# Patient Record
Sex: FEMALE | Race: WHITE | URBAN - METROPOLITAN AREA
[De-identification: names, ages, dates, MRNs, and addresses within clinical notes are randomized per-mention and may not be internally consistent; named-entity substitution may affect disease eponyms.]

---

## 2017-09-25 ENCOUNTER — OFFICE VISIT (OUTPATIENT)
Dept: PULMONOLOGY | Age: 52
End: 2017-09-25

## 2017-09-25 VITALS
OXYGEN SATURATION: 97 % | RESPIRATION RATE: 16 BRPM | WEIGHT: 210 LBS | HEIGHT: 65 IN | DIASTOLIC BLOOD PRESSURE: 78 MMHG | SYSTOLIC BLOOD PRESSURE: 124 MMHG | TEMPERATURE: 98.5 F | BODY MASS INDEX: 34.99 KG/M2 | HEART RATE: 65 BPM

## 2017-09-25 DIAGNOSIS — R06.83 SNORING: ICD-10-CM

## 2017-09-25 DIAGNOSIS — Z78.9 DIFFICULTY WITH BIPAP USE: ICD-10-CM

## 2017-09-25 DIAGNOSIS — J32.9 RECURRENT SINUS INFECTIONS: ICD-10-CM

## 2017-09-25 DIAGNOSIS — G47.33 OSA (OBSTRUCTIVE SLEEP APNEA): Primary | ICD-10-CM

## 2017-09-25 DIAGNOSIS — E66.01 MORBID OBESITY, UNSPECIFIED OBESITY TYPE (HCC): ICD-10-CM

## 2017-09-25 PROCEDURE — 3017F COLORECTAL CA SCREEN DOC REV: CPT | Performed by: INTERNAL MEDICINE

## 2017-09-25 PROCEDURE — G8427 DOCREV CUR MEDS BY ELIG CLIN: HCPCS | Performed by: INTERNAL MEDICINE

## 2017-09-25 PROCEDURE — 99203 OFFICE O/P NEW LOW 30 MIN: CPT | Performed by: INTERNAL MEDICINE

## 2017-09-25 PROCEDURE — 1036F TOBACCO NON-USER: CPT | Performed by: INTERNAL MEDICINE

## 2017-09-25 PROCEDURE — 3014F SCREEN MAMMO DOC REV: CPT | Performed by: INTERNAL MEDICINE

## 2017-09-25 PROCEDURE — G8417 CALC BMI ABV UP PARAM F/U: HCPCS | Performed by: INTERNAL MEDICINE

## 2017-09-25 RX ORDER — AMITRIPTYLINE HYDROCHLORIDE 10 MG/1
10 TABLET, FILM COATED ORAL
COMMUNITY
Start: 2017-04-25

## 2017-09-25 RX ORDER — FLUTICASONE PROPIONATE 50 MCG
SPRAY, SUSPENSION (ML) NASAL
COMMUNITY

## 2017-09-25 RX ORDER — CETIRIZINE HYDROCHLORIDE 10 MG/1
10 TABLET ORAL
COMMUNITY

## 2017-09-25 RX ORDER — DULOXETIN HYDROCHLORIDE 60 MG/1
60 CAPSULE, DELAYED RELEASE ORAL
COMMUNITY
Start: 2017-04-04

## 2017-09-25 RX ORDER — GABAPENTIN 400 MG/1
CAPSULE ORAL
COMMUNITY
Start: 2017-08-17

## 2017-09-25 RX ORDER — BACLOFEN 10 MG/1
10 TABLET ORAL 4 TIMES DAILY PRN
COMMUNITY

## 2017-09-25 RX ORDER — AZELASTINE 1 MG/ML
SPRAY, METERED NASAL
COMMUNITY

## 2017-09-25 ASSESSMENT — SLEEP AND FATIGUE QUESTIONNAIRES
HOW LIKELY ARE YOU TO NOD OFF OR FALL ASLEEP WHILE SITTING QUIETLY AFTER LUNCH WITHOUT ALCOHOL: 1
HOW LIKELY ARE YOU TO NOD OFF OR FALL ASLEEP WHILE LYING DOWN TO REST IN THE AFTERNOON WHEN CIRCUMSTANCES PERMIT: 1
NECK CIRCUMFERENCE (INCHES): 16.5
HOW LIKELY ARE YOU TO NOD OFF OR FALL ASLEEP IN A CAR, WHILE STOPPED FOR A FEW MINUTES IN TRAFFIC: 0
HOW LIKELY ARE YOU TO NOD OFF OR FALL ASLEEP WHILE WATCHING TV: 0
HOW LIKELY ARE YOU TO NOD OFF OR FALL ASLEEP WHEN YOU ARE A PASSENGER IN A CAR FOR AN HOUR WITHOUT A BREAK: 3
HOW LIKELY ARE YOU TO NOD OFF OR FALL ASLEEP WHILE SITTING INACTIVE IN A PUBLIC PLACE: 1
HOW LIKELY ARE YOU TO NOD OFF OR FALL ASLEEP WHILE SITTING AND TALKING TO SOMEONE: 0
HOW LIKELY ARE YOU TO NOD OFF OR FALL ASLEEP WHILE SITTING AND READING: 3
ESS TOTAL SCORE: 9

## 2017-10-25 ENCOUNTER — OFFICE VISIT (OUTPATIENT)
Dept: PULMONOLOGY | Age: 52
End: 2017-10-25

## 2017-10-25 VITALS
OXYGEN SATURATION: 98 % | SYSTOLIC BLOOD PRESSURE: 138 MMHG | TEMPERATURE: 98.6 F | HEIGHT: 65 IN | BODY MASS INDEX: 34.72 KG/M2 | WEIGHT: 208.4 LBS | HEART RATE: 58 BPM | RESPIRATION RATE: 16 BRPM | DIASTOLIC BLOOD PRESSURE: 78 MMHG

## 2017-10-25 DIAGNOSIS — J30.9 ALLERGIC RHINITIS, UNSPECIFIED CHRONICITY, UNSPECIFIED SEASONALITY, UNSPECIFIED TRIGGER: ICD-10-CM

## 2017-10-25 DIAGNOSIS — R06.83 SNORING: ICD-10-CM

## 2017-10-25 DIAGNOSIS — G47.33 OSA (OBSTRUCTIVE SLEEP APNEA): Primary | ICD-10-CM

## 2017-10-25 PROCEDURE — G8417 CALC BMI ABV UP PARAM F/U: HCPCS | Performed by: INTERNAL MEDICINE

## 2017-10-25 PROCEDURE — 3017F COLORECTAL CA SCREEN DOC REV: CPT | Performed by: INTERNAL MEDICINE

## 2017-10-25 PROCEDURE — 99214 OFFICE O/P EST MOD 30 MIN: CPT | Performed by: INTERNAL MEDICINE

## 2017-10-25 PROCEDURE — 1036F TOBACCO NON-USER: CPT | Performed by: INTERNAL MEDICINE

## 2017-10-25 PROCEDURE — G8484 FLU IMMUNIZE NO ADMIN: HCPCS | Performed by: INTERNAL MEDICINE

## 2017-10-25 PROCEDURE — 3014F SCREEN MAMMO DOC REV: CPT | Performed by: INTERNAL MEDICINE

## 2017-10-25 PROCEDURE — G8427 DOCREV CUR MEDS BY ELIG CLIN: HCPCS | Performed by: INTERNAL MEDICINE

## 2017-10-25 ASSESSMENT — SLEEP AND FATIGUE QUESTIONNAIRES
ESS TOTAL SCORE: 9
HOW LIKELY ARE YOU TO NOD OFF OR FALL ASLEEP WHILE SITTING AND TALKING TO SOMEONE: 0
HOW LIKELY ARE YOU TO NOD OFF OR FALL ASLEEP WHEN YOU ARE A PASSENGER IN A CAR FOR AN HOUR WITHOUT A BREAK: 2
NECK CIRCUMFERENCE (INCHES): 14.5
HOW LIKELY ARE YOU TO NOD OFF OR FALL ASLEEP WHILE SITTING AND READING: 2
HOW LIKELY ARE YOU TO NOD OFF OR FALL ASLEEP IN A CAR, WHILE STOPPED FOR A FEW MINUTES IN TRAFFIC: 0
HOW LIKELY ARE YOU TO NOD OFF OR FALL ASLEEP WHILE SITTING INACTIVE IN A PUBLIC PLACE: 0
HOW LIKELY ARE YOU TO NOD OFF OR FALL ASLEEP WHILE WATCHING TV: 2
HOW LIKELY ARE YOU TO NOD OFF OR FALL ASLEEP WHILE SITTING QUIETLY AFTER LUNCH WITHOUT ALCOHOL: 0
HOW LIKELY ARE YOU TO NOD OFF OR FALL ASLEEP WHILE LYING DOWN TO REST IN THE AFTERNOON WHEN CIRCUMSTANCES PERMIT: 3

## 2017-10-25 NOTE — PROGRESS NOTES
[facility]                                                                  CHIEF COMPLAINT: ALEXANDRU follow up         HPI:   Just started using her BiPAP since 10/6 when she had her so clean. Doing better. Patient is using BiPAP 9  hrs/night. Using humidifier. + snoring on BiPAP. The pressure is well tolerated. The mask is comfortable. No mask leak. No significant daytime sleepiness. No nodding off when driving. Bedtime is 10-12 am and rise time is 7-9 am. Sleep onset is 10 minutes. ESS is 9. SoClean helping a lot she said. Nasal sprays helping her rhinitis       Past Medical History:   Diagnosis Date    Fibromyalgia 05/2011    Sleep apnea        Past Surgical History:        Procedure Laterality Date    CARPAL TUNNEL RELEASE      both    TONSILLECTOMY         Allergies:  is allergic to erythromycin; tetracycline; ciprofloxacin; hydrocodone-acetaminophen; methotrexate derivatives; and other. Social History:    TOBACCO:   reports that she quit smoking about 40 years ago. Her smoking use included Cigarettes. She has never used smokeless tobacco.  ETOH:   reports that she does not drink alcohol.       Family History:       Problem Relation Age of Onset    Thyroid Disease Mother     High Blood Pressure Mother     Cancer Mother      lung cancer     Cancer Father      Lung cancer    Thyroid Disease Sister     No Known Problems Brother     Thyroid Disease Maternal Grandmother     Stroke Maternal Grandmother     Anemia Maternal Grandmother     No Known Problems Maternal Grandfather     Thyroid Disease Paternal Grandmother     Colon Cancer Paternal Grandmother     Heart Attack Paternal Grandmother     Parkinsonism Paternal Grandfather     Alcohol Abuse Paternal Grandfather        Current Medications:    Current Outpatient Prescriptions:     amitriptyline (ELAVIL) 10 MG tablet, Take 10 mg by mouth, Disp: , Rfl:     fluticasone

## 2017-12-06 ENCOUNTER — OFFICE VISIT (OUTPATIENT)
Dept: PULMONOLOGY | Age: 52
End: 2017-12-06

## 2017-12-06 ENCOUNTER — TELEPHONE (OUTPATIENT)
Dept: PULMONOLOGY | Age: 52
End: 2017-12-06

## 2017-12-06 VITALS
TEMPERATURE: 98.5 F | DIASTOLIC BLOOD PRESSURE: 68 MMHG | HEIGHT: 65 IN | BODY MASS INDEX: 34.52 KG/M2 | WEIGHT: 207.2 LBS | HEART RATE: 72 BPM | SYSTOLIC BLOOD PRESSURE: 128 MMHG | RESPIRATION RATE: 16 BRPM | OXYGEN SATURATION: 98 %

## 2017-12-06 DIAGNOSIS — J30.9 ALLERGIC RHINITIS, UNSPECIFIED CHRONICITY, UNSPECIFIED SEASONALITY, UNSPECIFIED TRIGGER: ICD-10-CM

## 2017-12-06 DIAGNOSIS — G47.33 OSA (OBSTRUCTIVE SLEEP APNEA): Primary | ICD-10-CM

## 2017-12-06 PROCEDURE — 3014F SCREEN MAMMO DOC REV: CPT | Performed by: INTERNAL MEDICINE

## 2017-12-06 PROCEDURE — 1036F TOBACCO NON-USER: CPT | Performed by: INTERNAL MEDICINE

## 2017-12-06 PROCEDURE — G8427 DOCREV CUR MEDS BY ELIG CLIN: HCPCS | Performed by: INTERNAL MEDICINE

## 2017-12-06 PROCEDURE — G8417 CALC BMI ABV UP PARAM F/U: HCPCS | Performed by: INTERNAL MEDICINE

## 2017-12-06 PROCEDURE — G8484 FLU IMMUNIZE NO ADMIN: HCPCS | Performed by: INTERNAL MEDICINE

## 2017-12-06 PROCEDURE — 3017F COLORECTAL CA SCREEN DOC REV: CPT | Performed by: INTERNAL MEDICINE

## 2017-12-06 PROCEDURE — 99214 OFFICE O/P EST MOD 30 MIN: CPT | Performed by: INTERNAL MEDICINE

## 2017-12-06 ASSESSMENT — SLEEP AND FATIGUE QUESTIONNAIRES
HOW LIKELY ARE YOU TO NOD OFF OR FALL ASLEEP WHILE SITTING QUIETLY AFTER LUNCH WITHOUT ALCOHOL: 1
HOW LIKELY ARE YOU TO NOD OFF OR FALL ASLEEP IN A CAR, WHILE STOPPED FOR A FEW MINUTES IN TRAFFIC: 0
HOW LIKELY ARE YOU TO NOD OFF OR FALL ASLEEP WHILE LYING DOWN TO REST IN THE AFTERNOON WHEN CIRCUMSTANCES PERMIT: 3
NECK CIRCUMFERENCE (INCHES): 14
HOW LIKELY ARE YOU TO NOD OFF OR FALL ASLEEP WHEN YOU ARE A PASSENGER IN A CAR FOR AN HOUR WITHOUT A BREAK: 1
HOW LIKELY ARE YOU TO NOD OFF OR FALL ASLEEP WHILE WATCHING TV: 2
HOW LIKELY ARE YOU TO NOD OFF OR FALL ASLEEP WHILE SITTING INACTIVE IN A PUBLIC PLACE: 0
HOW LIKELY ARE YOU TO NOD OFF OR FALL ASLEEP WHILE SITTING AND READING: 3
ESS TOTAL SCORE: 10
HOW LIKELY ARE YOU TO NOD OFF OR FALL ASLEEP WHILE SITTING AND TALKING TO SOMEONE: 0

## 2017-12-06 NOTE — PROGRESS NOTES
[facility]                                                                  CHIEF COMPLAINT: ALEXANDRU follow up         HPI:   BiPAP was increased and did not notice a difference. No more snoring on BiPAP. Patient is using BiPAP 8-9  hrs/night. Using humidifier. No snoring on BiPAP. The pressure is well tolerated. The mask is comfortable. No mask leak. No significant daytime sleepiness. No nodding off when driving. Bedtime is 10-12 am and rise time is 7-9 am. Sleep onset is few minutes. ESS is 10    Astelin and Flonase and working well- helping congestions         Past Medical History:   Diagnosis Date    Fibromyalgia 05/2011    Sleep apnea        Past Surgical History:        Procedure Laterality Date    CARPAL TUNNEL RELEASE      both    TONSILLECTOMY         Allergies:  is allergic to erythromycin; tetracycline; ciprofloxacin; hydrocodone-acetaminophen; methotrexate derivatives; and other. Social History:    TOBACCO:   reports that she quit smoking about 40 years ago. Her smoking use included Cigarettes. She has never used smokeless tobacco.  ETOH:   reports that she does not drink alcohol.       Family History:       Problem Relation Age of Onset    Thyroid Disease Mother     High Blood Pressure Mother     Cancer Mother      lung cancer     Cancer Father      Lung cancer    Thyroid Disease Sister     No Known Problems Brother     Thyroid Disease Maternal Grandmother     Stroke Maternal Grandmother     Anemia Maternal Grandmother     No Known Problems Maternal Grandfather     Thyroid Disease Paternal Grandmother     Colon Cancer Paternal Grandmother     Heart Attack Paternal Grandmother     Parkinsonism Paternal Grandfather     Alcohol Abuse Paternal Grandfather        Current Medications:    Current Outpatient Prescriptions:     amitriptyline (ELAVIL) 10 MG tablet, Take 10 mg by mouth, Disp: , Rfl:     fluticasone

## 2017-12-06 NOTE — PATIENT INSTRUCTIONS
Sleep Hygiene. .. Tips for better sleep. .. Avoid naps. This will ensure you are sleepy at bedtime. If you have to take a nap, sleep less than 1 hour, before 3 pm.  Sleep only when sleepy; this reduces the time you are awake in bed. Regular exercise is recommended to help you deepen your sleep, but not within 4-6 hours of your bedtime. Timing of exercise is important, aim to exercise early in the morning or early afternoon. A light snack may help you fall asleep. Warm milk and foods high in the amino acid tryptophan, such as bananas, may help you to sleep  Be sure to avoid heavy, spicy or sugary foods 4-6 hours before bedtime and avoid at snack time. Stay away from stimulants such as caffeine and nicotine for at least 4-6 hours before bed. Stimulants can interfere with your ability to fall asleep. Caffeine is found in tea, cola, coffee, cocoa and chocolate and is best avoided at bedtime. Nicotine is found in tobacco products. Avoid alcohol 4-6 hours before bedtime. Alcohol has an immediate sleep-inducing effect, after a few hours when alcohol levels fall there is a stimulant or wake-up effect and will cause fragmented sleep. Sleep rituals are important. Give your body clues it is time to slow down and sleep. Examples include; yoga, deep breathing, listen to relaxing music, a hot bath or a few minutes of reading. Have a fixed bedtime and awakening time, Even on weekends! You will feel better keeping a regular sleep cycle, even if you are retired or not working. Get into your favorite sleep position. If not asleep in 30 minutes, get up and do something boring until you feel sleepy. Remember not to expose yourself to bright lights such as TV, phone or tablet screens. Only use your bed for sleeping. Do not use your bed as an office, workroom or recreation room. Use comfortable bedding. Uncomfortable bedding can prevent good sleep. Ensure your bedroom is quiet and comfortable.  A cooler room along with enough blankets to stay warm is recommended. If your room is too noisy, try a white noise machine. If too bright, try black out shades or an eye mask. Dont take worries to bed. Leave worries about work, school etc. behind you when you go to bed. Some people find it helpful to assign a worry period in the evening or late afternoon to write down your worries and get them out of your system.

## 2018-06-07 ENCOUNTER — OFFICE VISIT (OUTPATIENT)
Dept: PULMONOLOGY | Age: 53
End: 2018-06-07

## 2018-06-07 VITALS
HEART RATE: 67 BPM | TEMPERATURE: 98.1 F | DIASTOLIC BLOOD PRESSURE: 68 MMHG | WEIGHT: 212 LBS | OXYGEN SATURATION: 97 % | SYSTOLIC BLOOD PRESSURE: 126 MMHG | HEIGHT: 65 IN | BODY MASS INDEX: 35.32 KG/M2 | RESPIRATION RATE: 18 BRPM

## 2018-06-07 DIAGNOSIS — G47.33 OSA (OBSTRUCTIVE SLEEP APNEA): Primary | ICD-10-CM

## 2018-06-07 DIAGNOSIS — J30.9 ALLERGIC RHINITIS, UNSPECIFIED CHRONICITY, UNSPECIFIED SEASONALITY, UNSPECIFIED TRIGGER: ICD-10-CM

## 2018-06-07 DIAGNOSIS — F45.8 AEROPHAGIA: ICD-10-CM

## 2018-06-07 DIAGNOSIS — G47.33 OBSTRUCTIVE SLEEP APNEA SYNDROME: ICD-10-CM

## 2018-06-07 PROCEDURE — G8417 CALC BMI ABV UP PARAM F/U: HCPCS | Performed by: INTERNAL MEDICINE

## 2018-06-07 PROCEDURE — G8427 DOCREV CUR MEDS BY ELIG CLIN: HCPCS | Performed by: INTERNAL MEDICINE

## 2018-06-07 PROCEDURE — 1036F TOBACCO NON-USER: CPT | Performed by: INTERNAL MEDICINE

## 2018-06-07 PROCEDURE — 99214 OFFICE O/P EST MOD 30 MIN: CPT | Performed by: INTERNAL MEDICINE

## 2018-06-07 PROCEDURE — 3017F COLORECTAL CA SCREEN DOC REV: CPT | Performed by: INTERNAL MEDICINE

## 2018-06-07 ASSESSMENT — SLEEP AND FATIGUE QUESTIONNAIRES
HOW LIKELY ARE YOU TO NOD OFF OR FALL ASLEEP WHILE WATCHING TV: 1
HOW LIKELY ARE YOU TO NOD OFF OR FALL ASLEEP WHILE SITTING AND TALKING TO SOMEONE: 0
ESS TOTAL SCORE: 9
HOW LIKELY ARE YOU TO NOD OFF OR FALL ASLEEP WHILE LYING DOWN TO REST IN THE AFTERNOON WHEN CIRCUMSTANCES PERMIT: 2
HOW LIKELY ARE YOU TO NOD OFF OR FALL ASLEEP WHILE SITTING QUIETLY AFTER LUNCH WITHOUT ALCOHOL: 1
HOW LIKELY ARE YOU TO NOD OFF OR FALL ASLEEP IN A CAR, WHILE STOPPED FOR A FEW MINUTES IN TRAFFIC: 0
HOW LIKELY ARE YOU TO NOD OFF OR FALL ASLEEP WHILE SITTING AND READING: 3
HOW LIKELY ARE YOU TO NOD OFF OR FALL ASLEEP WHILE SITTING INACTIVE IN A PUBLIC PLACE: 0
HOW LIKELY ARE YOU TO NOD OFF OR FALL ASLEEP WHEN YOU ARE A PASSENGER IN A CAR FOR AN HOUR WITHOUT A BREAK: 2
NECK CIRCUMFERENCE (INCHES): 16

## 2019-07-25 ENCOUNTER — OFFICE VISIT (OUTPATIENT)
Dept: PULMONOLOGY | Age: 54
End: 2019-07-25
Payer: MEDICARE

## 2019-07-25 VITALS
OXYGEN SATURATION: 98 % | DIASTOLIC BLOOD PRESSURE: 88 MMHG | SYSTOLIC BLOOD PRESSURE: 136 MMHG | HEART RATE: 62 BPM | WEIGHT: 205 LBS | BODY MASS INDEX: 34.16 KG/M2 | HEIGHT: 65 IN

## 2019-07-25 DIAGNOSIS — F45.8 AEROPHAGIA: ICD-10-CM

## 2019-07-25 DIAGNOSIS — J30.9 ALLERGIC RHINITIS, UNSPECIFIED SEASONALITY, UNSPECIFIED TRIGGER: ICD-10-CM

## 2019-07-25 DIAGNOSIS — G47.33 OSA TREATED WITH BIPAP: Primary | ICD-10-CM

## 2019-07-25 PROCEDURE — 99214 OFFICE O/P EST MOD 30 MIN: CPT | Performed by: INTERNAL MEDICINE

## 2019-07-25 PROCEDURE — 1036F TOBACCO NON-USER: CPT | Performed by: INTERNAL MEDICINE

## 2019-07-25 PROCEDURE — 3017F COLORECTAL CA SCREEN DOC REV: CPT | Performed by: INTERNAL MEDICINE

## 2019-07-25 PROCEDURE — G8427 DOCREV CUR MEDS BY ELIG CLIN: HCPCS | Performed by: INTERNAL MEDICINE

## 2019-07-25 PROCEDURE — G8417 CALC BMI ABV UP PARAM F/U: HCPCS | Performed by: INTERNAL MEDICINE

## 2019-07-25 ASSESSMENT — SLEEP AND FATIGUE QUESTIONNAIRES
HOW LIKELY ARE YOU TO NOD OFF OR FALL ASLEEP WHILE WATCHING TV: 1
ESS TOTAL SCORE: 7
HOW LIKELY ARE YOU TO NOD OFF OR FALL ASLEEP WHILE SITTING AND TALKING TO SOMEONE: 0
NECK CIRCUMFERENCE (INCHES): 14.75
HOW LIKELY ARE YOU TO NOD OFF OR FALL ASLEEP WHILE SITTING QUIETLY AFTER LUNCH WITHOUT ALCOHOL: 0
HOW LIKELY ARE YOU TO NOD OFF OR FALL ASLEEP IN A CAR, WHILE STOPPED FOR A FEW MINUTES IN TRAFFIC: 1
HOW LIKELY ARE YOU TO NOD OFF OR FALL ASLEEP WHILE SITTING AND READING: 1
HOW LIKELY ARE YOU TO NOD OFF OR FALL ASLEEP WHILE LYING DOWN TO REST IN THE AFTERNOON WHEN CIRCUMSTANCES PERMIT: 3
HOW LIKELY ARE YOU TO NOD OFF OR FALL ASLEEP WHILE SITTING INACTIVE IN A PUBLIC PLACE: 1
HOW LIKELY ARE YOU TO NOD OFF OR FALL ASLEEP WHEN YOU ARE A PASSENGER IN A CAR FOR AN HOUR WITHOUT A BREAK: 0

## 2019-07-25 NOTE — PROGRESS NOTES
reviewed by me. 6-7 hrs/night with 73% compliance and AHI of  3.6. BIPAP 17/12       Assessment:       · ALEXANDRU. BiPAP 17/12 cmH2O. Full face mask. Optimal compliance and efficacy upon review today. · Allergic rhinitis    · Aerophagia- better         Plan:    · Biflex   · Elevate head of the bed and Encourage supine sleep   · Advised to use BiPAP 6-8 hrs at night and during naps. · Continue Astelin and Flonase PRN   · Replacement of mask, tubing, head straps every 3-6 months or sooner if damaged. · Follow up BIPAP compliance and pressure adjustment if needed  · Sleep hygiene  · Avoid sedatives, alcohol and caffeinated drinks at bed time. · No driving motorized vehicles or operating heavy machinery while fatigue, drowsy or sleepy. · Weight loss is also recommended as a long-term intervention.

## 2020-05-06 ENCOUNTER — TELEPHONE (OUTPATIENT)
Dept: PULMONOLOGY | Age: 55
End: 2020-05-06

## 2020-07-27 ENCOUNTER — TELEPHONE (OUTPATIENT)
Dept: PULMONOLOGY | Age: 55
End: 2020-07-27

## 2020-07-27 NOTE — TELEPHONE ENCOUNTER
limited to the following:    Your Provider(s) may not able to provide medical treatment for your particular condition and you may be required to seek alternative healthcare or emergency care services.  The electronic systems or other security protocols or safeguards used in the Service could fail, causing a breach of privacy of your medical or other information.  Given regulatory requirements in certain jurisdictions, your Provider(s) diagnosis and/or treatment options, especially pertaining to certain prescriptions, may be limited. Acceptance   1. You understand that Services will be provided via Telehealth. This process involves the use of HIPAA compliant and secure, real-time audio-visual interfacing with a qualified and appropriately trained provider located at [facility]. 2. You understand that, under no circumstances, will this session be recorded. 3. You understand that the Provider(s) at [facility] and other clinical participants will be party to the information obtained during the Telehealth session in accordance with best medical practices. 4. You understand that the information obtained during the Telehealth session will be used to help determine the most appropriate treatment options. 5. You understand that You have the right to revoke this consent at any point in time. 6. You understand that Telehealth is voluntary, and that continued treatment is not dependent upon consent. 7. You understand that, in the event of non-consent to Telehealth services and/or technical difficulties, you will obtain services as typically provided in the absence of Telehealth technology. 8. You understand that this consent will be kept in Your medical record. 9. No potential benefits from the use of Telehealth or specific results can be guaranteed. Your condition may not be cured or improved and, in some cases, may get worse.    10. There are limitations in the provision of medical care and treatment via Telehealth and the Service and you may not be able to receive diagnosis and/or treatment through the Service for every condition for which you seek diagnosis and/or treatment. 11. There are potential risks to the use of Telehealth, including but not limited to the risks described in this Telehealth Consent. 12. Your Provider(s) have discussed the use of Telehealth and the Service with you, including the benefits and risks of such and you have provided oral consent to your Provider(s) for the use of Telehealth and the Service. 15. You understand that it is your duty to provide your Provider(s) truthful, accurate and complete information, including all relevant information regarding care that you may have received or may be receiving from other healthcare providers outside of the Service. 14. You understand that each of your Provider(s) may determine in his or sole discretion that your condition is not suitable for diagnosis and/or treatment using the Service, and that you may need to seek medical care and treatment a specialist or other healthcare provider, outside of the Service. 15. You understand that you are fully responsible for payment for all services provided by Provider(s) or through use of the Service and that you may not be able to use third-party insurance. 16. You represent that (a) you have read this Telehealth Consent carefully, (b) you understand the risks and benefits of the Service and the use of Telehealth in the medical care and treatment provided to you by Provider(s) using the Service, and (c) you have the legal capacity and authority to provide this consent for yourself and/or the minor for which you are consenting under applicable federal and state laws, including laws relating to the age of [de-identified] and/or parental/guardian consent.    17. You give your informed consent to the use of Telehealth by Provider(s) using the Service under the terms described in the Terms of Service and this Telehealth Consent. The patient was read the following statement and has consented to the visit as of 7/27/20. The patient has been scheduled for their first telehealth visit on 8/6/2020 with Dr Douglas García.

## 2020-08-06 ENCOUNTER — VIRTUAL VISIT (OUTPATIENT)
Dept: PULMONOLOGY | Age: 55
End: 2020-08-06
Payer: MEDICARE

## 2020-08-06 PROCEDURE — 99214 OFFICE O/P EST MOD 30 MIN: CPT | Performed by: INTERNAL MEDICINE

## 2020-08-06 ASSESSMENT — SLEEP AND FATIGUE QUESTIONNAIRES
HOW LIKELY ARE YOU TO NOD OFF OR FALL ASLEEP WHILE SITTING QUIETLY AFTER LUNCH WITHOUT ALCOHOL: 0
HOW LIKELY ARE YOU TO NOD OFF OR FALL ASLEEP WHILE LYING DOWN TO REST IN THE AFTERNOON WHEN CIRCUMSTANCES PERMIT: 3
HOW LIKELY ARE YOU TO NOD OFF OR FALL ASLEEP WHILE SITTING INACTIVE IN A PUBLIC PLACE: 0
HOW LIKELY ARE YOU TO NOD OFF OR FALL ASLEEP WHILE SITTING AND TALKING TO SOMEONE: 0
HOW LIKELY ARE YOU TO NOD OFF OR FALL ASLEEP WHEN YOU ARE A PASSENGER IN A CAR FOR AN HOUR WITHOUT A BREAK: 2
HOW LIKELY ARE YOU TO NOD OFF OR FALL ASLEEP WHILE SITTING AND READING: 2
HOW LIKELY ARE YOU TO NOD OFF OR FALL ASLEEP WHILE WATCHING TV: 0
HOW LIKELY ARE YOU TO NOD OFF OR FALL ASLEEP IN A CAR, WHILE STOPPED FOR A FEW MINUTES IN TRAFFIC: 0
ESS TOTAL SCORE: 7

## 2020-08-06 NOTE — PROGRESS NOTES
[facility]                                                          TELEHEALTH EVALUATION: Service performed was Audio/Visual (During CKNGS-87 public health emergency) and not a face-to-face visit           [facility] follow up         HPI:   Doing good. Patient is using BiPAP 7-8  hrs/night. Using humidifier. No snoring on BiPAP. Mask and pressure are okay. No significant daytime sleepiness. No nodding off when driving. Bedtime is 11-12 am and rise time is 8-8:30 am. Sleep onset is few minutes. ESS is 7    Astelin and Flonase PRN with help         Past Medical History:   Diagnosis Date    Fibromyalgia 05/2011    Sleep apnea        Past Surgical History:        Procedure Laterality Date    CARPAL TUNNEL RELEASE      both    TONSILLECTOMY         Allergies:  is allergic to doxycycline; erythromycin; red dye; tetracycline; ciprofloxacin; hydrocodone-acetaminophen; methotrexate derivatives; and other. Social History:    TOBACCO:   reports that she has never smoked. She has never used smokeless tobacco.  ETOH:   reports no history of alcohol use.       Family History:       Problem Relation Age of Onset    Thyroid Disease Mother     High Blood Pressure Mother     Cancer Mother         lung cancer     Cancer Father         Lung cancer    Thyroid Disease Sister     No Known Problems Brother     Thyroid Disease Maternal Grandmother     Stroke Maternal Grandmother     Anemia Maternal Grandmother     No Known Problems Maternal Grandfather     Thyroid Disease Paternal Grandmother     Colon Cancer Paternal Grandmother     Heart Attack Paternal Grandmother     Parkinsonism Paternal Grandfather     Alcohol Abuse Paternal Grandfather        Current Medications:    Current Outpatient Medications:     Calcium Carbonate-Vitamin D (CALTRATE 600+D PO), Take 2 tablets by mouth daily, Disp: , Rfl:     amitriptyline (ELAVIL) 10 MG tablet, Take 10 mg by mouth, Disp: , Rfl:     fluticasone (FLONASE) 50 MCG/ACT nasal spray, by Nasal route, Disp: , Rfl:     azelastine (ASTELIN) 0.1 % nasal spray, by Nasal route, Disp: , Rfl:     baclofen (LIORESAL) 10 MG tablet, Take 10 mg by mouth 4 times daily as needed , Disp: , Rfl:     cetirizine (ZYRTEC) 10 MG tablet, Take 10 mg by mouth, Disp: , Rfl:     DULoxetine (CYMBALTA) 60 MG extended release capsule, Take 60 mg by mouth, Disp: , Rfl:     gabapentin (NEURONTIN) 400 MG capsule, twice daily. , Disp: , Rfl:         Objective:   PHYSICAL EXAM:    There were no vitals taken for this visit.' on RA  O2 Sat:  HR:  BP:  RR:  Temperature:  Neck size: Not able to obtain   Mallampati class IV. Constitutional:  No acute distress. Appears well developed and nourished. Eyes: No sclera icterus. EOM intact. No visible discharge. HENT: Head is normocephalic and atraumatic. Mucus membranes are moist and the tongue appears normal. Normal appearing nose. External Ears normal.   Neck: No visualized mass. Evin Shackleton is midline   Resp: No accessory muscle use. Respiratory effort normal. No visualized signs of difficulty breathing or respiratory distress. Cardiovascular: No LE edema per patient's report   Musculoskeletal: No signs of ataxia. Normal range of motion of the neck. Skin: No significant exanthematous lesions or discoloration noted on facial skin    Neuro: Awake. Alert. Able to follow commands. No facial asymmetry. No gaze palsy. Psych: No agitation. Normal affect. No hallucinations. Oriented to person/time/place. No anxiety. Normal judgement and insight. DATA reviewed by me:   BiPAP titration 2/15/2016 BiPAP 13/9 cmh2O   PSG 2/9/2016 RDI 14.2         BiPAP data last 1 week reviewed by me. 8 hrs/night with 100% compliance and AHI of  8.4. BIPAP 13/9  BiPAP data last 4 week reviewed by me. 8 hrs/night with 93%   compliance and AHI of  7.2.  BIPAP 15/10  BiPAP data 05/08-06/06 2018 reviewed by me. 7-8 hrs/night with 96% compliance and AHI of  3.6. BIPAP 17/12   BiPAP data 06/25-07/24 2019 reviewed by me. 6-7 hrs/night with 73% compliance and AHI of  3.6. BIPAP 17/12   BiPAP data 05/06-08/03 2020 reviewed by me. 7-8 hrs/night with 87% compliance and AHI of  3.2. BIPAP 17/12     Assessment:       · ALEXANDRU. BiPAP 17/12 cmH2O. Full face mask. Optimal compliance and efficacy upon review today. · Allergic rhinitis    · Aerophagia- better         Plan:    · Continue BiPAP 6-8 hrs at night and during naps. · Continue Astelin/Flonase PRN   · Replacement of mask, tubing, head straps every 3-6 months or sooner if damaged. · Follow up BIPAP compliance and pressure adjustment if needed  · Sleep hygiene  · Avoid sedatives, alcohol and caffeinated drinks at bed time. · No driving motorized vehicles or operating heavy machinery while fatigue, drowsy or sleepy. · Weight loss is also recommended as a long-term intervention. · Follow up in 1 year        Michoacano Ann is a 63 y.o. female being evaluated by a Virtual Visit (video visit) encounter to address concerns as mentioned above. A caregiver was present when appropriate. Due to this being a TeleHealth encounter (During YCD-99 public health emergency), evaluation of the following organ systems was limited: Vitals/Constitutional/EENT/Resp/CV/GI//MS/Neuro/Skin/Heme-Lymph-Imm. Pursuant to the emergency declaration under the [facility], [address] [place] [address] authority and the [employer] Act, this Virtual Visit was conducted with patient's (and/or legal guardian's) consent, to reduce the patient's risk of exposure to COVID-19 and provide necessary medical care.   The patient (and/or legal guardian) has also been advised to contact this office for worsening conditions or problems, and seek emergency medical treatment and/or call 911 if deemed necessary. Services were provided through a video synchronous discussion virtually to substitute for in-person clinic visit. Patient was located in her home, provider was located in his office. --Mara Bejarano MD on 8/6/2020 at 11:29 AM    An electronic signature was used to authenticate this note.

## 2021-07-06 ENCOUNTER — TELEPHONE (OUTPATIENT)
Dept: PULMONOLOGY | Age: 56
End: 2021-07-06

## 2021-07-06 DIAGNOSIS — G47.33 OSA (OBSTRUCTIVE SLEEP APNEA): Primary | ICD-10-CM

## 2021-07-06 NOTE — TELEPHONE ENCOUNTER
Patient is requesting that an order for a new BiPAP machine be sent to [facility] in [place] because her current machine is broken and cannot be repaired. LOV: 8/6/20  Assessment:       · ALEXANDRU. BiPAP 17/12 cmH2O. Full face mask. Optimal compliance and efficacy upon review today. · Allergic rhinitis    · Aerophagia- better          Plan:    · Continue BiPAP 6-8 hrs at night and during naps. · Continue Astelin/Flonase PRN   · Replacement of mask, tubing, head straps every 3-6 months or sooner if damaged. · Follow up BIPAP compliance and pressure adjustment if needed  · Sleep hygiene  · Avoid sedatives, alcohol and caffeinated drinks at bed time. · No driving motorized vehicles or operating heavy machinery while fatigue, drowsy or sleepy. · Weight loss is also recommended as a long-term intervention.     · Follow up in 1 year

## 2021-07-06 NOTE — TELEPHONE ENCOUNTER
Joy called back to say [facility] told her to make sure the order says they are to set the pressure on her machine, if not she said she would have to bring it in the office to be done and she does not want to have to do this

## 2021-07-12 NOTE — TELEPHONE ENCOUNTER
Pt called stating that [facility] does not have her BIPAP order. Printed and refaxed order. Will f/u to make sure order is rec'd.

## 2021-07-14 NOTE — TELEPHONE ENCOUNTER
Spoke with Shae at [facility] who stated they received the Bipap order and it has been submitted to the re-pap team for processing.

## 2021-08-02 ENCOUNTER — TELEPHONE (OUTPATIENT)
Dept: PULMONOLOGY | Age: 56
End: 2021-08-02

## 2021-08-02 NOTE — TELEPHONE ENCOUNTER
Patient cancelled appointment on 8/9/21 with Dr Arleen Francis for 1 yr sleep . Reason: na    Patient did reschedule appointment. Appointment rescheduled for 11/10/21. Assessment: 8/6/20      · ALEXANDRU. BiPAP 17/12 cmH2O. Full face mask. Optimal compliance and efficacy upon review today. · Allergic rhinitis    · Aerophagia- better          Plan:    · Continue BiPAP 6-8 hrs at night and during naps. · Continue Astelin/Flonase PRN   · Replacement of mask, tubing, head straps every 3-6 months or sooner if damaged. · Follow up BIPAP compliance and pressure adjustment if needed  · Sleep hygiene  · Avoid sedatives, alcohol and caffeinated drinks at bed time. · No driving motorized vehicles or operating heavy machinery while fatigue, drowsy or sleepy. · Weight loss is also recommended as a long-term intervention.     · Follow up in 1 year

## 2021-08-31 ENCOUNTER — TELEPHONE (OUTPATIENT)
Dept: PULMONOLOGY | Age: 56
End: 2021-08-31

## 2021-08-31 NOTE — TELEPHONE ENCOUNTER
Pt called stating that she has been waiting for [facility] to get a new machine since July 4th weekend, and pt went to [facility] today and was told that machines are not available. Pt called [facility] and they have machines in stock, and available to dispense. Pt requested to have orders faxed to [facility]. Orders printed and faxed .

## 2021-11-24 ENCOUNTER — OFFICE VISIT (OUTPATIENT)
Dept: PULMONOLOGY | Age: 56
End: 2021-11-24
Payer: MEDICARE

## 2021-11-24 ENCOUNTER — TELEPHONE (OUTPATIENT)
Dept: PULMONOLOGY | Age: 56
End: 2021-11-24

## 2021-11-24 VITALS
OXYGEN SATURATION: 99 % | SYSTOLIC BLOOD PRESSURE: 146 MMHG | WEIGHT: 205 LBS | RESPIRATION RATE: 17 BRPM | HEIGHT: 65 IN | HEART RATE: 74 BPM | DIASTOLIC BLOOD PRESSURE: 69 MMHG | TEMPERATURE: 98 F | BODY MASS INDEX: 34.16 KG/M2

## 2021-11-24 DIAGNOSIS — E66.9 OBESITY (BMI 30-39.9): ICD-10-CM

## 2021-11-24 DIAGNOSIS — Z71.89 ENCOUNTER FOR BIPAP USE COUNSELING: ICD-10-CM

## 2021-11-24 DIAGNOSIS — G47.33 MILD OBSTRUCTIVE SLEEP APNEA: Primary | ICD-10-CM

## 2021-11-24 PROCEDURE — G8484 FLU IMMUNIZE NO ADMIN: HCPCS | Performed by: NURSE PRACTITIONER

## 2021-11-24 PROCEDURE — 99213 OFFICE O/P EST LOW 20 MIN: CPT | Performed by: NURSE PRACTITIONER

## 2021-11-24 PROCEDURE — G8427 DOCREV CUR MEDS BY ELIG CLIN: HCPCS | Performed by: NURSE PRACTITIONER

## 2021-11-24 PROCEDURE — 3017F COLORECTAL CA SCREEN DOC REV: CPT | Performed by: NURSE PRACTITIONER

## 2021-11-24 PROCEDURE — 1036F TOBACCO NON-USER: CPT | Performed by: NURSE PRACTITIONER

## 2021-11-24 PROCEDURE — G8417 CALC BMI ABV UP PARAM F/U: HCPCS | Performed by: NURSE PRACTITIONER

## 2021-11-24 ASSESSMENT — SLEEP AND FATIGUE QUESTIONNAIRES
HOW LIKELY ARE YOU TO NOD OFF OR FALL ASLEEP WHILE SITTING QUIETLY AFTER LUNCH WITHOUT ALCOHOL: 0
ESS TOTAL SCORE: 6
HOW LIKELY ARE YOU TO NOD OFF OR FALL ASLEEP WHILE LYING DOWN TO REST IN THE AFTERNOON WHEN CIRCUMSTANCES PERMIT: 3
HOW LIKELY ARE YOU TO NOD OFF OR FALL ASLEEP WHILE SITTING AND TALKING TO SOMEONE: 0
HOW LIKELY ARE YOU TO NOD OFF OR FALL ASLEEP IN A CAR, WHILE STOPPED FOR A FEW MINUTES IN TRAFFIC: 0
NECK CIRCUMFERENCE (INCHES): 14.5
HOW LIKELY ARE YOU TO NOD OFF OR FALL ASLEEP WHEN YOU ARE A PASSENGER IN A CAR FOR AN HOUR WITHOUT A BREAK: 0
HOW LIKELY ARE YOU TO NOD OFF OR FALL ASLEEP WHILE WATCHING TV: 1
HOW LIKELY ARE YOU TO NOD OFF OR FALL ASLEEP WHILE SITTING INACTIVE IN A PUBLIC PLACE: 0
HOW LIKELY ARE YOU TO NOD OFF OR FALL ASLEEP WHILE SITTING AND READING: 2

## 2021-11-24 NOTE — PATIENT INSTRUCTIONS
enough blankets to stay warm is recommended. If your room is too noisy, try a white noise machine. If too bright, try black out shades or an eye mask. Dont take worries to bed. Leave worries about work, school etc. behind you when you go to bed. Some people find it helpful to assign a worry period in the evening or late afternoon to write down your worries and get them out of your system. CPAP Equipment Cleaning and Disinfecting Schedule  Equipment Cleaning Frequency Instructions  Disinfecting Frequency   Non-Disposable Filters  Weekly Mild soapy water, Rinse, Air Dry Not Required   Disposable Filters Change as needed  2-4 weeks Do Not Wash Not Required   Hose/tubing Daily Mild soapy water, Rinse, Air Dry Once a week   Mask / Nasal Pillows Daily Mild soapy water, Rinse, Air Dry Once a week   Headgear Weekly Hand wash, Mild soapy water, Rinse, Dry  Not Required   Humidifier Daily Empty water daily  Mild soapy water, Rinse well, Air Dry  Once a week   CPAP Unit As Needed Dust with damp cloth,  No detergents or sprays Not Required         Disinfect (per schedule) with 1 part white vinegar and 3 parts water- soak mask and water chamber for 30 minutes every 1-2 weeks, more often if sick. Allow water/vinegar mixture to run through tubing. Allow all equipment to air dry. Drying Hints:   Always hang tubing away from direct sunlight, as this will cause the tubing to become yellow, brittle and crack over a period of time. DO NOT attach the wet tubing to your CPAP unit to blow-dry it. The moisture from the tubing can drain back into your machine. Moisture in your unit can cause sudden pressure increases or short circuits  DO's and DON'Ts:  - Don't use alcohol-based products to clean your mask, because it can cause the materials to become hard and brittle.    - Don't put headgear in the washer or dryer  - Don't use any caustic or household cleaning solutions such as bleach on your CPAP   equipment.  - Do follow the recommended cleaning schedule. - Do change your disposable filter frequently. Adapted From: [email or web address].   These are general suggestions for all models please follow specific s recommendations and specific instructions

## 2021-11-24 NOTE — PROGRESS NOTES
Patient ID: Octaviano Garnica is a 61 y.o. female who is being seen today for   Chief Complaint   Patient presents with    Sleep Apnea     1 year sleep          HPI:     Octaviano Garnica is a 61 y.o. female in office for ALEXANDRU follow up. States she received new BIPAP. Patient is using BiPAP  7  hrs/night. Using humidifier. No snoring on BiPAP. The pressure is well tolerated. The mask is comfortable- full face. No mask leak. No significant daytieme sleepiness. No nodding off when driving. No dry nose or throat. Minimal fatigue. Bedtime is 11 pm- Mn and rise time is 630-7 am. Sleep onset is few minutes. Wakes up 0 times at night total. No nocturia. Rare naps during the day. No headache in am. No weight gain. 1-2 caffienated beverages during the day. Occasional  alcohol. ESS is 6. Sleep Medicine 11/24/2021 8/6/2020 7/25/2019 6/7/2018 12/6/2017 10/25/2017 9/25/2017   Sitting and reading 2 2 1 3 3 2 3   Watching TV 1 0 1 1 2 2 0   Sitting, inactive in a public place (e.g. a theatre or a meeting) 0 0 1 0 0 0 1   As a passenger in a car for an hour without a break 0 2 0 2 1 2 3   Lying down to rest in the afternoon when circumstances permit 3 3 3 2 3 3 1   Sitting and talking to someone 0 0 0 0 0 0 0   Sitting quietly after a lunch without alcohol 0 0 0 1 1 0 1   In a car, while stopped for a few minutes in traffic 0 0 1 0 0 0 0   Total score 6 7 7 9 10 9 9   Neck circumference 14.5 - 14.75 16 14 14.5 16.5       Past Medical History:  Past Medical History:   Diagnosis Date    Fibromyalgia 05/2011    Sleep apnea        Past Surgical History:        Procedure Laterality Date    CARPAL TUNNEL RELEASE      both    OTHER SURGICAL HISTORY  08/21/2021    L4,L5 fusion     TONSILLECTOMY         Allergies:  is allergic to doxycycline, erythromycin, red dye, tetracycline, ciprofloxacin, hydrocodone-acetaminophen, methotrexate derivatives, and other. Social History:    TOBACCO:   reports that she has never smoked.  She has never used smokeless tobacco.  ETOH:   reports no history of alcohol use. Family History:       Problem Relation Age of Onset    Thyroid Disease Mother     High Blood Pressure Mother     Cancer Mother         lung cancer     Cancer Father         Lung cancer    Thyroid Disease Sister     No Known Problems Brother     Thyroid Disease Maternal Grandmother     Stroke Maternal Grandmother     Anemia Maternal Grandmother     No Known Problems Maternal Grandfather     Thyroid Disease Paternal Grandmother     Colon Cancer Paternal Grandmother     Heart Attack Paternal Grandmother     Parkinsonism Paternal Grandfather     Alcohol Abuse Paternal Grandfather        Current Medications:    Current Outpatient Medications:     amitriptyline (ELAVIL) 10 MG tablet, Take 10 mg by mouth, Disp: , Rfl:     fluticasone (FLONASE) 50 MCG/ACT nasal spray, by Nasal route, Disp: , Rfl:     azelastine (ASTELIN) 0.1 % nasal spray, by Nasal route, Disp: , Rfl:     baclofen (LIORESAL) 10 MG tablet, Take 10 mg by mouth 4 times daily as needed , Disp: , Rfl:     cetirizine (ZYRTEC) 10 MG tablet, Take 10 mg by mouth, Disp: , Rfl:     DULoxetine (CYMBALTA) 60 MG extended release capsule, Take 60 mg by mouth, Disp: , Rfl:     gabapentin (NEURONTIN) 400 MG capsule, twice daily. , Disp: , Rfl:     Calcium Carbonate-Vitamin D (CALTRATE 600+D PO), Take 2 tablets by mouth daily (Patient not taking: Reported on 11/24/2021), Disp: , Rfl:       Objective:   PHYSICAL EXAM:    BP (!) 146/69   Pulse 74   Temp 98 °F (36.7 °C)   Resp 17   Ht 5' 5\" (1.651 m)   Wt 205 lb (93 kg)   SpO2 99% Comment: RA  BMI 34.11 kg/m²     Physical exam:  Gen: No acute distress. Obese. BMI of 34.11  Eyes: PERRL. No sclera icterus. No conjunctival injection. ENT: No discharge. Neck: Trachea midline. No obvious mass. Neck circumference 14.5\"  Resp: No accessory muscle use. No crackles. No wheezes. No rhonchi. CV: Regular rate. Regular rhythm.  No murmur or rub. Skin: Warm and dry. M/S: No cyanosis. No obvious joint deformity. Neuro: Awake. Alert. Moves all four extremities. Psych: Oriented x 3. No anxiety. DATA:   2/9/2016 PSG AHI 8.5, low SPO2 74%  2/10/2016 MSLT mean sleep latency 2 minutes 23 seconds, no sleep onset REM  2/22/2016 titration fairly well controlled ALEXANDRU with BiPAP, not CPAP. Treatment emergent central sleep apnea, recommendations trial BiPAP 13/9 cm H2O    BiPAP download data:  Compliance download report from 10/25/21 to 11/23/21 reviewed today by me and showed patient is using machine 7:04 hrs/night with 93% compliance and AHI 4.8 within this time frame. 28/30days with greater than 4 hours of machine use. BIPAP 17/12 cm H20    Assessment:      · Mild ALEXANDRU. BiPAP 17/12 cm H2O. Optimal compliance and efficacy on review today, residual AHI 4.8  · Snoring-resolved on BiPAP  · Fatigue-improved with BiPAP  · Obesity    Plan:     -Send order to change to BiPAP 18-13 cm H2O  -Follow AHI and adjust pressure if needed  - Advised to use CPAP 6-8 hrs at night and during naps. - Replacement of mask, tubing, head straps every 3-6 months or sooner if damaged. - Patient instructed to contact [employer] company for any mask, tubing or machine trouble shooting if problems arise.  - Sleep hygiene  - Avoid sedatives, alcohol and caffeinated drinks at bed time. - Patient counseled to never drive or operate heavy machinery while fatigue, drowsy or sleepy.    - Weight loss is recommended as a long-term intervention.    - Complications of ALEXANDRU if not treated were discussed with patient patient, including: systemic hypertension, pulmonary hypertension, cardiovascular morbidities, car accidents and all cause mortality.  -Patient education handout provided regarding sleep tips and CPAP cleaning recommendations     Follow up: 1 year, sooner if needed

## 2021-12-27 NOTE — TELEPHONE ENCOUNTER
BiPAP download report from 11/27/2021-12/26/2021 1 BiPAP 18/13 cm H2O reviewed. Compliance is adequate 77%. AHI has improved and is good 3.9.

## 2022-11-21 ENCOUNTER — TELEPHONE (OUTPATIENT)
Dept: PULMONOLOGY | Age: 57
End: 2022-11-21

## 2022-11-21 ENCOUNTER — OFFICE VISIT (OUTPATIENT)
Dept: PULMONOLOGY | Age: 57
End: 2022-11-21
Payer: MEDICARE

## 2022-11-21 VITALS
HEART RATE: 63 BPM | DIASTOLIC BLOOD PRESSURE: 80 MMHG | RESPIRATION RATE: 16 BRPM | BODY MASS INDEX: 35.45 KG/M2 | SYSTOLIC BLOOD PRESSURE: 139 MMHG | WEIGHT: 212.8 LBS | OXYGEN SATURATION: 97 % | HEIGHT: 65 IN

## 2022-11-21 DIAGNOSIS — R53.83 OTHER FATIGUE: ICD-10-CM

## 2022-11-21 DIAGNOSIS — Z71.89 ENCOUNTER FOR BIPAP USE COUNSELING: ICD-10-CM

## 2022-11-21 DIAGNOSIS — R06.83 SNORING: ICD-10-CM

## 2022-11-21 DIAGNOSIS — G47.33 MILD OBSTRUCTIVE SLEEP APNEA: Primary | ICD-10-CM

## 2022-11-21 DIAGNOSIS — E66.9 OBESITY (BMI 30-39.9): ICD-10-CM

## 2022-11-21 PROCEDURE — G8417 CALC BMI ABV UP PARAM F/U: HCPCS | Performed by: NURSE PRACTITIONER

## 2022-11-21 PROCEDURE — G8427 DOCREV CUR MEDS BY ELIG CLIN: HCPCS | Performed by: NURSE PRACTITIONER

## 2022-11-21 PROCEDURE — 99213 OFFICE O/P EST LOW 20 MIN: CPT | Performed by: NURSE PRACTITIONER

## 2022-11-21 PROCEDURE — 1036F TOBACCO NON-USER: CPT | Performed by: NURSE PRACTITIONER

## 2022-11-21 PROCEDURE — 3017F COLORECTAL CA SCREEN DOC REV: CPT | Performed by: NURSE PRACTITIONER

## 2022-11-21 PROCEDURE — G8484 FLU IMMUNIZE NO ADMIN: HCPCS | Performed by: NURSE PRACTITIONER

## 2022-11-21 RX ORDER — DICLOXACILLIN SODIUM 250 MG/1
75 CAPSULE ORAL 2 TIMES DAILY
COMMUNITY

## 2022-11-21 ASSESSMENT — SLEEP AND FATIGUE QUESTIONNAIRES
HOW LIKELY ARE YOU TO NOD OFF OR FALL ASLEEP WHILE SITTING QUIETLY AFTER LUNCH WITHOUT ALCOHOL: 0
HOW LIKELY ARE YOU TO NOD OFF OR FALL ASLEEP WHILE LYING DOWN TO REST IN THE AFTERNOON WHEN CIRCUMSTANCES PERMIT: 1
HOW LIKELY ARE YOU TO NOD OFF OR FALL ASLEEP WHILE WATCHING TV: 0
ESS TOTAL SCORE: 2
HOW LIKELY ARE YOU TO NOD OFF OR FALL ASLEEP WHILE SITTING INACTIVE IN A PUBLIC PLACE: 0
HOW LIKELY ARE YOU TO NOD OFF OR FALL ASLEEP WHILE SITTING AND TALKING TO SOMEONE: 0
HOW LIKELY ARE YOU TO NOD OFF OR FALL ASLEEP WHILE SITTING AND READING: 1
NECK CIRCUMFERENCE (INCHES): 14
HOW LIKELY ARE YOU TO NOD OFF OR FALL ASLEEP WHEN YOU ARE A PASSENGER IN A CAR FOR AN HOUR WITHOUT A BREAK: 0
HOW LIKELY ARE YOU TO NOD OFF OR FALL ASLEEP IN A CAR, WHILE STOPPED FOR A FEW MINUTES IN TRAFFIC: 0

## 2022-11-21 NOTE — PATIENT INSTRUCTIONS
Here are some tips to to getting better sleep  1- Avoid napping during the day: This will ensure you are tired at bedtime. If you have to take a nap, sleep less than one hour, before 3 pm.   2- Exercise regularly, but not right before bed: but the timing of the workout is important. Exercising in the morning or early afternoon will not interfere with sleep. Exercising within two hours before bedtime can decrease your ability to fall asleep. Regular exercise is recommended to help you deepen the sleep. 3- Avoid heavy, spicy, or sugary foods 4-6 hours before bedtime: These can affect your ability to stay asleep. 4- Have a light snack before bed: Having an empty stomach can interfere with your sleep. Dairy products and turkey contain tryptophan, which acts as a natural sleep inducer. 5- Stay away from caffeine, nicotine and alcohol at least 4-6 hours before bed: Caffeine and nicotine are stimulants that interfere with your ability to fall asleep. While alcohol has an immediate sleep-inducing effect, a few hours later, as alcohol levels in your blood start to fall, there is a stimulant effect and you will experience fragmented sleep. 6- Take a hot bath 90 minutes before bedtime:  A hot bath will raise your body temperature, but it is the drop in body temperature that may leave you feeling sleepy  7- Develop sleep rituals: it is important to give your body cues that it is time to slow down and sleep. Listen to relaxing music, read something soothing for 15 minutes, have a cup of caffeine free tea, or do relaxation exercises such as yoga or deep breathing help relieve anxiety and reduce muscle tension. 8- Fix a bedtime and an awakening time: Even on weekends! When your sleep cycle has a regular rhythm, you will feel better. 9- Sleep only when sleepy: This reduces the time you are awake in bed.    10- Get into your favorite sleeping position: If you can't fall asleep within 15-30 minutes, get up and do something boring until you feel sleepy. Sit quietly in the dark or read the warranty on your refrigerator. Don't expose yourself to bright light while you are up, it gives cues to your brain that it is time to wake up. 11- Only use your bed for sleeping: Dont use the bed as an office, workroom or recreation room. Let your body \"know\" that the bed is associated with sleeping  12- Use comfortable bedding. Uncomfortable bedding can prevent good sleep. Evaluate whether or not this is a source of your problem, and make appropriate changes. 13- Make sure your bed and bedroom are quiet and comfortable: A hot room can be uncomfortable. A cooler room, along with enough blankets to stay warm is recommended. Get a blackout shade or wear a slumber mask and wear earplugs or get a \"white noise\" machine for light and noise distractions. 14- Use sunlight to set your biological clock: When you get up in the morning, go outside and turn your face to the sun for 15 minutes. 13- Dont take your worries to bed: Leave worries about job, school, daily life, etc., behind when you go to bed. Some people find it useful to assign a \"worry period\" during the evening or afternoon for these issues.    CPAP Equipment Cleaning and Disinfecting Schedule  Equipment Cleaning Frequency Instructions  Disinfecting Frequency   Non-Disposable Filters  Weekly Mild soapy water, Rinse, Air Dry Not Required   Disposable Filters Change as needed  2-4 weeks Do Not Wash Not Required   Hose/tubing Daily Mild soapy water, Rinse, Air Dry Once a week   Mask / Nasal Pillows Daily Mild soapy water, Rinse, Air Dry Once a week   Headgear Weekly Hand wash, Mild soapy water, Rinse, Dry  Not Required   Humidifier Daily Empty water daily  Mild soapy water, Rinse well, Air Dry  Once a week   CPAP Unit As Needed Dust with damp cloth,  No detergents or sprays Not Required         Disinfect (per schedule) with 1 part white vinegar and 3 parts water- soak mask and water chamber for 30 minutes every 1-2 weeks, more often if sick. Allow water/vinegar mixture to run through tubing. Allow all equipment to air dry. Drying Hints:   Always hang tubing away from direct sunlight, as this will cause the tubing to become yellow, brittle and crack over a period of time. DO NOT attach the wet tubing to your CPAP unit to blow-dry it. The moisture from the tubing can drain back into your machine. Moisture in your unit can cause sudden pressure increases or short circuits  DO's and DON'Ts:  - Don't use alcohol-based products to clean your mask, because it can cause the materials to become hard and brittle. - Don't put headgear in the washer or dryer  - Don't use any caustic or household cleaning solutions such as bleach on your CPAP   equipment.  - Do follow the recommended cleaning schedule. - Do change your disposable filter frequently. Adapted From: [email or web address]/cleaning. shtm.   These are general suggestions for all models please follow specific s recommendations and specific instructions

## 2022-11-21 NOTE — PROGRESS NOTES
Patient ID: Ferny Li is a 61 y.o. female who is being seen today for   Chief Complaint   Patient presents with    Follow-up     1yr sleep cr scanned          HPI:     Ferny Li is a 61 y.o. female in office for ALEXANDRU follow up. States she is doing well with BIPAP. Patient is using BiPAP  7-8 hrs/night. Using humidifier. +snoring on BiPAP. The pressure is well tolerated. The mask is comfortable- full face. No significant mask leak. No significant daytime sleepiness. No nodding off when driving. No dry nose or throat. Minimal fatigue. Bedtime is 11 pm- MN and rise time is 8 am. Sleep onset is 10-15 minutes. Wakes up 0 times at night total. No nocturia. Rare naps during the day. No headache in am. No weight gain. 1-2 caffienated beverages during the day. Occasional alcohol. ESS is 2    Sleep Medicine 11/21/2022 11/24/2021 8/6/2020 7/25/2019 6/7/2018 12/6/2017 10/25/2017   Sitting and reading 1 2 2 1 3 3 2   Watching TV 0 1 0 1 1 2 2   Sitting, inactive in a public place (e.g. a theatre or a meeting) 0 0 0 1 0 0 0   As a passenger in a car for an hour without a break 0 0 2 0 2 1 2   Lying down to rest in the afternoon when circumstances permit 1 3 3 3 2 3 3   Sitting and talking to someone 0 0 0 0 0 0 0   Sitting quietly after a lunch without alcohol 0 0 0 0 1 1 0   In a car, while stopped for a few minutes in traffic 0 0 0 1 0 0 0   [place] Sleepiness Score 2 6 7 7 9 10 9   Neck circumference (Inches) 14 14.5 - 14.75 16 14 14.5       Past Medical History:  Past Medical History:   Diagnosis Date    Fibromyalgia 05/2011    Sleep apnea        Past Surgical History:        Procedure Laterality Date    CARPAL TUNNEL RELEASE      both    OTHER SURGICAL HISTORY  08/21/2021    L4,L5 fusion     TONSILLECTOMY         Allergies:  is allergic to doxycycline, erythromycin, red dye, tetracycline, ciprofloxacin, hydrocodone-acetaminophen, methotrexate derivatives, and other.   Social History:    TOBACCO:   reports that she has never smoked. She has never used smokeless tobacco.  ETOH:   reports no history of alcohol use. Family History:       Problem Relation Age of Onset    Thyroid Disease Mother     High Blood Pressure Mother     Cancer Mother         lung cancer     Cancer Father         Lung cancer    Thyroid Disease Sister     No Known Problems Brother     Thyroid Disease Maternal Grandmother     Stroke Maternal Grandmother     Anemia Maternal Grandmother     No Known Problems Maternal Grandfather     Thyroid Disease Paternal Grandmother     Colon Cancer Paternal Grandmother     Heart Attack Paternal Grandmother     Parkinsonism Paternal Grandfather     Alcohol Abuse Paternal Grandfather        Current Medications:    Current Outpatient Medications:     dicloxacillin (DYNAPEN) 250 MG capsule, Take 75 mg by mouth 2 times daily, Disp: , Rfl:     Calcium Carbonate-Vitamin D (CALTRATE 600+D PO), Take 2 tablets by mouth daily, Disp: , Rfl:     amitriptyline (ELAVIL) 10 MG tablet, Take 10 mg by mouth, Disp: , Rfl:     fluticasone (FLONASE) 50 MCG/ACT nasal spray, by Nasal route, Disp: , Rfl:     azelastine (ASTELIN) 0.1 % nasal spray, by Nasal route, Disp: , Rfl:     baclofen (LIORESAL) 10 MG tablet, Take 10 mg by mouth 4 times daily as needed , Disp: , Rfl:     cetirizine (ZYRTEC) 10 MG tablet, Take 10 mg by mouth, Disp: , Rfl:     DULoxetine (CYMBALTA) 60 MG extended release capsule, Take 60 mg by mouth, Disp: , Rfl:     gabapentin (NEURONTIN) 400 MG capsule, twice daily. , Disp: , Rfl:       Objective:   PHYSICAL EXAM:    /80 (Site: Left Upper Arm, Position: Sitting, Cuff Size: Medium Adult)   Pulse 63   Resp 16   Ht 5' 5\" (1.651 m)   Wt 212 lb 12.8 oz (96.5 kg)   SpO2 97%   BMI 35.41 kg/m²     Physical exam:  Gen: No acute distress. Obese. BMI of 35.41  Eyes: PERRL. No sclera icterus. No conjunctival injection. ENT: No discharge. Neck: Trachea midline. No obvious mass.  Neck circumference 14\"  Resp: No accessory muscle use. No crackles. No wheezes. No rhonchi. CV: Regular rate. Regular rhythm. No murmur or rub. Skin: Warm and dry. M/S: No cyanosis. No obvious joint deformity. Neuro: Awake. Alert. Moves all four extremities. Psych: Oriented x 3. No anxiety. DATA:   2/9/2016 PSG AHI 8.5, low SPO2 74%  2/10/2016 MSLT mean sleep latency 2 minutes 23 seconds, no sleep onset REM  2/22/2016 titration fairly well controlled ALEXANDRU with BiPAP, not CPAP. Treatment emergent central sleep apnea, recommendations trial BiPAP 13/9 cm H2O    BiPAP download data:  Compliance download report from 10/25/21 to 11/23/21 reviewed today by me and showed patient is using machine 7:04 hrs/night with 93% compliance and AHI 4.8 within this time frame. 28/30days with greater than 4 hours of machine use. BIPAP 17/12 cm H20    Compliance download report from 10/18/22 to 11/16/22 reviewed today by me and showed patient is using machine 7:28 hrs/night with 87% compliance and AHI 2.6 within this time frame. 26/30days with greater than 4 hours of machine use. BIPAP 18/13  cm H20       Assessment:      Mild ALEXANDRU. BiPAP 18/13 cm H2O. Optimal compliance  on review today  Snoring-on BiPAP  Fatigue-improved with BiPAP  Obesity    Plan:     -Changed to BiPAP 19/14 cm H2O  -Follow AHI and adjust pressure if needed  -Patient to call if she continues snoring with BiPAP  - Advised to use BIPAP 6-8 hrs at night and during naps. - Replacement of mask, tubing, head straps every 3-6 months or sooner if damaged. - Patient instructed to contact [employer] for any mask, tubing or machine trouble shooting if problems arise.  - Sleep hygiene  - Avoid sedatives, alcohol and caffeinated drinks at bed time. - Patient counseled to never drive or operate heavy machinery while fatigue, drowsy or sleepy.    - Weight loss is recommended as a long-term intervention.    - Complications of ALEXANDRU if not treated were discussed with patient patient, including: systemic hypertension, pulmonary hypertension, cardiovascular morbidities, car accidents and all cause mortality.  -Patient education handout provided regarding sleep tips and CPAP cleaning recommendations     Follow up: 1 year, sooner if needed

## 2022-12-22 NOTE — TELEPHONE ENCOUNTER
BiPAP download report from 11/21/2022 - 12/20/2022 on BiPAP 19/14 cm H2O reviewed. Compliance is good 83%.   AHI is good 2.4

## 2023-01-03 ENCOUNTER — TELEPHONE (OUTPATIENT)
Dept: PULMONOLOGY | Age: 58
End: 2023-01-03

## 2023-01-03 DIAGNOSIS — G47.33 MILD OBSTRUCTIVE SLEEP APNEA: Primary | ICD-10-CM

## 2023-01-03 NOTE — TELEPHONE ENCOUNTER
Pt called stating that for well over a year she has been having trouble with air building up in her stomach. Pt forgot to mention it at last visit. Pt states that about 4 nights a week she will wake up and has to belch (like a beer drinker in a bar). Pt states that there is so much air in her stomach that its uncomfortable. Asked pt if she noticed it being worse since pressure increase at last visit, and pt doesn't indicate so, that she has continued having trouble. Please advise. Compliance scanned for review.

## 2023-01-04 NOTE — TELEPHONE ENCOUNTER
Can change to BiPAP 17/12 cm H2O for tolerance. Please get new download report in about a month.   Patient to call with further questions/concerns

## 2023-01-05 NOTE — TELEPHONE ENCOUNTER
Patient informed with verbal understanding. Order pended.  Will make sure pressure gets changed and get report 30 days after

## 2023-01-11 NOTE — TELEPHONE ENCOUNTER
Pressure not changed. Called and spoke to [facility] and he said that they did not receive order.  Order refaxed

## 2023-02-14 NOTE — TELEPHONE ENCOUNTER
90BiPAP download report from 1/15/2023 - 2/13/2023 on BiPAP 17/12 cm H2O reviewed. Compliance is good 93%. AHI is good 2.9. Did pressure decrease help with air swallowing?

## 2023-02-16 NOTE — TELEPHONE ENCOUNTER
Pt returned call and was informed. Pt states that she is doing better, had issues for the first 3 days and thinks it was bc the settings hadn't been changed at that point. Pt states that she has done much better. Advised pt to call if she begins to have trouble. Pt verbalized understanding.

## 2023-11-20 ENCOUNTER — OFFICE VISIT (OUTPATIENT)
Dept: PULMONOLOGY | Age: 58
End: 2023-11-20
Payer: MEDICARE

## 2023-11-20 ENCOUNTER — TELEPHONE (OUTPATIENT)
Dept: PULMONOLOGY | Age: 58
End: 2023-11-20

## 2023-11-20 VITALS
DIASTOLIC BLOOD PRESSURE: 86 MMHG | HEIGHT: 65 IN | OXYGEN SATURATION: 98 % | BODY MASS INDEX: 34.49 KG/M2 | WEIGHT: 207 LBS | HEART RATE: 58 BPM | SYSTOLIC BLOOD PRESSURE: 134 MMHG

## 2023-11-20 DIAGNOSIS — Z71.89 ENCOUNTER FOR BIPAP USE COUNSELING: ICD-10-CM

## 2023-11-20 DIAGNOSIS — G47.33 MILD OBSTRUCTIVE SLEEP APNEA: Primary | ICD-10-CM

## 2023-11-20 DIAGNOSIS — F45.8 AEROPHAGIA: ICD-10-CM

## 2023-11-20 DIAGNOSIS — E66.9 OBESITY (BMI 30-39.9): ICD-10-CM

## 2023-11-20 PROCEDURE — G8427 DOCREV CUR MEDS BY ELIG CLIN: HCPCS | Performed by: NURSE PRACTITIONER

## 2023-11-20 PROCEDURE — 3017F COLORECTAL CA SCREEN DOC REV: CPT | Performed by: NURSE PRACTITIONER

## 2023-11-20 PROCEDURE — G8484 FLU IMMUNIZE NO ADMIN: HCPCS | Performed by: NURSE PRACTITIONER

## 2023-11-20 PROCEDURE — 1036F TOBACCO NON-USER: CPT | Performed by: NURSE PRACTITIONER

## 2023-11-20 PROCEDURE — G8417 CALC BMI ABV UP PARAM F/U: HCPCS | Performed by: NURSE PRACTITIONER

## 2023-11-20 PROCEDURE — 99214 OFFICE O/P EST MOD 30 MIN: CPT | Performed by: NURSE PRACTITIONER

## 2023-11-20 RX ORDER — DICLOFENAC SODIUM 75 MG/1
75 TABLET, DELAYED RELEASE ORAL 2 TIMES DAILY WITH MEALS
COMMUNITY
Start: 2023-11-05

## 2023-11-20 ASSESSMENT — SLEEP AND FATIGUE QUESTIONNAIRES
ESS TOTAL SCORE: 4
HOW LIKELY ARE YOU TO NOD OFF OR FALL ASLEEP WHILE SITTING INACTIVE IN A PUBLIC PLACE: 1
HOW LIKELY ARE YOU TO NOD OFF OR FALL ASLEEP WHILE LYING DOWN TO REST IN THE AFTERNOON WHEN CIRCUMSTANCES PERMIT: 1
HOW LIKELY ARE YOU TO NOD OFF OR FALL ASLEEP WHILE SITTING QUIETLY AFTER LUNCH WITHOUT ALCOHOL: 0
HOW LIKELY ARE YOU TO NOD OFF OR FALL ASLEEP WHILE WATCHING TV: 1
HOW LIKELY ARE YOU TO NOD OFF OR FALL ASLEEP WHILE SITTING AND TALKING TO SOMEONE: 0
HOW LIKELY ARE YOU TO NOD OFF OR FALL ASLEEP WHILE SITTING AND READING: 1
HOW LIKELY ARE YOU TO NOD OFF OR FALL ASLEEP IN A CAR, WHILE STOPPED FOR A FEW MINUTES IN TRAFFIC: 0
HOW LIKELY ARE YOU TO NOD OFF OR FALL ASLEEP WHEN YOU ARE A PASSENGER IN A CAR FOR AN HOUR WITHOUT A BREAK: 0
NECK CIRCUMFERENCE (INCHES): 14.75

## 2023-11-20 NOTE — PROGRESS NOTES
Patient ID: Galileo Shaikh is a 60 y.o. female who is being seen today for   Chief Complaint   Patient presents with    Follow-up     31/90           HPI:     Galileo Shaikh is a 60 y.o. female in office for ALEXANDRU follow up. States she is doing well with BiPAP. States still having some air swallowing wakes has to belch and pass gas then is good. States pressure decrease did help. Patient is using BiPAP   7-8 hrs/night. Using humidifier. No snoring on BiPAP. The pressure is well tolerated. The mask is comfortable- full face mask. No mask leak. No significant daytime sleepiness. No nodding off when driving. No dry nose or throat. No fatigue. Bedtime is MN and rise time is 7-8  am. Sleep onset is 5 minutes. Wakes up 0 times at night total. No nocturia. Occasional naps during the day. No headache in am. No weight gain. 2-3 caffienated beverages during the day. Occasional alcohol.  ESS is 4          11/20/2023    11:41 AM 11/21/2022    10:37 AM 11/24/2021     9:37 AM 8/6/2020    10:56 AM 7/25/2019    10:54 AM 6/7/2018     9:01 AM 12/6/2017    11:16 AM   Sleep Medicine   Sitting and reading 1 1 2 2 1 3 3   Watching TV 1 0 1 0 1 1 2   Sitting, inactive in a public place (e.g. a theatre or a meeting) 1 0 0 0 1 0 0   As a passenger in a car for an hour without a break 0 0 0 2 0 2 1   Lying down to rest in the afternoon when circumstances permit 1 1 3 3 3 2 3   Sitting and talking to someone 0 0 0 0 0 0 0   Sitting quietly after a lunch without alcohol 0 0 0 0 0 1 1   In a car, while stopped for a few minutes in traffic 0 0 0 0 1 0 0   [place] Sleepiness Score 4 2 6 7 7 9 10   Neck circumference (Inches) 14.75 14 14.5  14.75 16 14       Past Medical History:  Past Medical History:   Diagnosis Date    Fibromyalgia 05/2011    Sleep apnea        Past Surgical History:        Procedure Laterality Date    CARPAL TUNNEL RELEASE      both    OTHER SURGICAL HISTORY  08/21/2021    L4,L5 fusion     TONSILLECTOMY

## 2023-11-20 NOTE — TELEPHONE ENCOUNTER
Faxed order for pressure change and full face mask to [facility] at [phone].

## 2024-11-21 ENCOUNTER — OFFICE VISIT (OUTPATIENT)
Dept: SLEEP MEDICINE | Age: 59
End: 2024-11-21
Payer: COMMERCIAL

## 2024-11-21 ENCOUNTER — TELEPHONE (OUTPATIENT)
Dept: SLEEP MEDICINE | Age: 59
End: 2024-11-21

## 2024-11-21 VITALS
HEIGHT: 65 IN | BODY MASS INDEX: 34.66 KG/M2 | OXYGEN SATURATION: 95 % | SYSTOLIC BLOOD PRESSURE: 150 MMHG | WEIGHT: 208 LBS | HEART RATE: 57 BPM | DIASTOLIC BLOOD PRESSURE: 70 MMHG

## 2024-11-21 DIAGNOSIS — E66.9 OBESITY (BMI 30-39.9): ICD-10-CM

## 2024-11-21 DIAGNOSIS — Z71.89 CPAP USE COUNSELING: ICD-10-CM

## 2024-11-21 DIAGNOSIS — G47.33 MILD OBSTRUCTIVE SLEEP APNEA: Primary | ICD-10-CM

## 2024-11-21 PROCEDURE — 99213 OFFICE O/P EST LOW 20 MIN: CPT | Performed by: NURSE PRACTITIONER

## 2024-11-21 RX ORDER — MONTELUKAST SODIUM 10 MG/1
10 TABLET ORAL DAILY
COMMUNITY
Start: 2024-10-28

## 2024-11-21 RX ORDER — LEVOCETIRIZINE DIHYDROCHLORIDE 5 MG/1
5 TABLET, FILM COATED ORAL NIGHTLY
COMMUNITY

## 2024-11-21 ASSESSMENT — SLEEP AND FATIGUE QUESTIONNAIRES
HOW LIKELY ARE YOU TO NOD OFF OR FALL ASLEEP WHILE SITTING QUIETLY AFTER LUNCH WITHOUT ALCOHOL: SLIGHT CHANCE OF DOZING
HOW LIKELY ARE YOU TO NOD OFF OR FALL ASLEEP IN A CAR, WHILE STOPPED FOR A FEW MINUTES IN TRAFFIC: WOULD NEVER DOZE
ESS TOTAL SCORE: 6
HOW LIKELY ARE YOU TO NOD OFF OR FALL ASLEEP WHILE WATCHING TV: WOULD NEVER DOZE
HOW LIKELY ARE YOU TO NOD OFF OR FALL ASLEEP WHILE SITTING AND TALKING TO SOMEONE: WOULD NEVER DOZE
HOW LIKELY ARE YOU TO NOD OFF OR FALL ASLEEP WHEN YOU ARE A PASSENGER IN A CAR FOR AN HOUR WITHOUT A BREAK: WOULD NEVER DOZE
HOW LIKELY ARE YOU TO NOD OFF OR FALL ASLEEP WHILE SITTING INACTIVE IN A PUBLIC PLACE: WOULD NEVER DOZE
HOW LIKELY ARE YOU TO NOD OFF OR FALL ASLEEP WHILE LYING DOWN TO REST IN THE AFTERNOON WHEN CIRCUMSTANCES PERMIT: HIGH CHANCE OF DOZING
HOW LIKELY ARE YOU TO NOD OFF OR FALL ASLEEP WHILE SITTING AND READING: MODERATE CHANCE OF DOZING

## 2024-11-21 NOTE — PROGRESS NOTES
call if she continues snoring with BiPAP  - Advised to use BIPAP 6-8 hrs at night and during naps.  - Replacement of mask, tubing, head straps every 3-6 months or sooner if damaged.   - Patient instructed to contact [employer] company for any mask, tubing or machine trouble shooting if problems arise.  - Sleep hygiene  - Avoid sedatives, alcohol and caffeinated drinks at bed time.  - Patient counseled to never drive or operate heavy machinery while fatigue, drowsy or sleepy.   - Weight loss is recommended as a long-term intervention.    - Complications of ALEXANDRU if not treated were discussed with patient patient, including: systemic hypertension, pulmonary hypertension, cardiovascular morbidities, car accidents and all cause mortality.  -Patient education handout provided regarding sleep tips and PAP cleaning recommendations     Follow up: 1 year, sooner if needed